# Patient Record
Sex: FEMALE | URBAN - NONMETROPOLITAN AREA
[De-identification: names, ages, dates, MRNs, and addresses within clinical notes are randomized per-mention and may not be internally consistent; named-entity substitution may affect disease eponyms.]

---

## 2023-05-03 ENCOUNTER — OUTSIDE SERVICES (OUTPATIENT)
Dept: INTERNAL MEDICINE | Age: 83
End: 2023-05-03

## 2023-05-03 DIAGNOSIS — R13.10 DYSPHAGIA, UNSPECIFIED TYPE: ICD-10-CM

## 2023-05-03 DIAGNOSIS — R47.1 DYSARTHRIA: ICD-10-CM

## 2023-05-03 DIAGNOSIS — E78.5 HYPERLIPIDEMIA, UNSPECIFIED HYPERLIPIDEMIA TYPE: Primary | ICD-10-CM

## 2023-05-03 DIAGNOSIS — I63.9 CEREBROVASCULAR ACCIDENT (CVA), UNSPECIFIED MECHANISM (HCC): ICD-10-CM

## 2023-05-03 DIAGNOSIS — G47.00 INSOMNIA, UNSPECIFIED TYPE: ICD-10-CM

## 2023-06-03 PROBLEM — E78.5 HYPERLIPIDEMIA: Status: ACTIVE | Noted: 2023-06-03

## 2023-06-03 PROBLEM — G47.00 INSOMNIA: Status: ACTIVE | Noted: 2023-06-03

## 2023-06-03 PROBLEM — I63.9 CEREBROVASCULAR ACCIDENT (CVA) (HCC): Status: ACTIVE | Noted: 2023-06-03

## 2023-06-03 NOTE — PROGRESS NOTES
Date of Admission: 4/28/2023  Date of Encounter: 5/3/2023  Patient:  Christina Nelson  YOB: 1940      Chief Complaint: Admission to [address] [place] [address] after treatment for CVA    History of Present Illness:  She was admitted to [facility] after treatment in the hospital CVA, she seems to be doing better now, she has a history of dementia but says that she feels fine. Seems to have dysarthria, but has no symptoms at this time. She denies headaches, visual changes, but says that she is a bit week. No issues reported by the nursing staff    Past Medical History: Hyperlipidemia  Past Family History: Noncontributory  Social History: Nonsmoker, non-drinker at this time    Review of Systems:  Constitutional: Denies fever, chills. Cardiac: Denies chest pain, palpitations  Except as dictated above, all other systems reviewed and are negative     Physical Exam  Vitals: /69. P 79. T 97.9. R 20  Constitutional: No acute distress  Eyes: No lid lag or proptosis. PERRLA  HENT: External ears normal. No lesions on the lips  Respiratory: Good air entry bilaterally. No wheezing or crackles  Cardiovascular: Normal S1-S2. No murmurs  Gastrointestinal: No visible veins or masses. Good bowel sounds  Skin: No abnormal rashes. No digital cyanosis  Neurologic: Cranial nerves grossly intact. Sensation grossly intact  Psychiatric: Alert. Normal Affect. Assessments   Diagnosis Orders   1. Hyperlipidemia, unspecified hyperlipidemia type        2. Dysphagia, unspecified type        3. Insomnia, unspecified type        4. Cerebrovascular accident (CVA), unspecified mechanism ([address]        5. Dysarthria            Plan  Will continue other medications unless otherwise noted. I will be notified with any change in her condition. This note has been created using the [employer] health record, and dictated in part by [employer] dictation system.  Despite the documenting physician's best

## 2023-06-07 ENCOUNTER — OUTSIDE SERVICES (OUTPATIENT)
Dept: INTERNAL MEDICINE | Age: 83
End: 2023-06-07

## 2023-06-07 DIAGNOSIS — G47.00 INSOMNIA, UNSPECIFIED TYPE: ICD-10-CM

## 2023-06-07 DIAGNOSIS — E78.5 HYPERLIPIDEMIA, UNSPECIFIED HYPERLIPIDEMIA TYPE: Primary | ICD-10-CM

## 2023-06-07 DIAGNOSIS — R13.10 DYSPHAGIA, UNSPECIFIED TYPE: ICD-10-CM

## 2023-06-07 DIAGNOSIS — R47.1 DYSARTHRIA: ICD-10-CM

## 2023-06-07 DIAGNOSIS — I63.9 CEREBROVASCULAR ACCIDENT (CVA), UNSPECIFIED MECHANISM (HCC): ICD-10-CM

## 2023-06-07 LAB
BACTERIA, URINE: ABNORMAL /HPF
BILIRUBIN URINE: NEGATIVE
BLOOD, URINE: NEGATIVE
CALCIUM OXALATE CRYSTALS: ABNORMAL /HPF
CASTS UA: ABNORMAL /LPF
CLARITY: ABNORMAL
COLOR, URINE: YELLOW
CRYSTALS, UA: PRESENT
GLUCOSE URINE: NEGATIVE MG/DL
KETONES, URINE: NEGATIVE MG/DL
LEUKOCYTE ESTERASE, URINE: ABNORMAL
NITRITE, URINE: NEGATIVE
PH UA: 6.5 (ref 5–8.5)
PROTEIN UA: NEGATIVE MG/DL
RBC URINE: ABNORMAL /HPF (ref 0–2)
SPECIFIC GRAVITY, URINE: 1.01 MG/DL (ref 1–1.03)
SQUAMOUS EPITHELIAL: ABNORMAL /HPF
UROBILINOGEN, URINE: 0.2 MG/DL (ref 0.2–1)
WBC URINE: ABNORMAL /HPF (ref 0–4)

## 2023-06-07 RX ORDER — SERTRALINE HYDROCHLORIDE 25 MG/1
25 TABLET, FILM COATED ORAL EVERY EVENING
COMMUNITY

## 2023-06-07 RX ORDER — BISACODYL 5 MG/1
5 TABLET, DELAYED RELEASE ORAL DAILY PRN
COMMUNITY

## 2023-06-07 RX ORDER — CLOPIDOGREL BISULFATE 75 MG/1
75 TABLET ORAL EVERY MORNING
COMMUNITY

## 2023-06-07 RX ORDER — ATORVASTATIN CALCIUM 40 MG/1
40 TABLET, FILM COATED ORAL EVERY EVENING
COMMUNITY

## 2023-06-07 RX ORDER — ACETAMINOPHEN 500 MG
1000 TABLET ORAL EVERY 8 HOURS PRN
COMMUNITY

## 2023-06-07 RX ORDER — SENNOSIDES A AND B 8.6 MG/1
1 TABLET, FILM COATED ORAL DAILY PRN
COMMUNITY

## 2023-06-07 RX ORDER — POLYETHYLENE GLYCOL 3350 17 G/17G
17 POWDER, FOR SOLUTION ORAL EVERY MORNING
COMMUNITY

## 2023-06-07 RX ORDER — ASPIRIN 81 MG/1
81 TABLET ORAL EVERY MORNING
COMMUNITY

## 2023-06-08 NOTE — PROGRESS NOTES
Date of Encounter: 6/7/2023  Patient:  Sedrick Beebe  YOB: 1940      Chief Complaint: Diarrhea    History of Present Illness:  I was informed by nursing staff that she has diarrhea, and has been using laxatives, Colace and MiraLAX. She confirms this, but denies abdominal pain, nausea, vomiting, hematochezia. Denies fever or chills. She does have a history of hemorrhoids, but is now more bothered about diarrhea. Past Medical/Surgical/Social History: Per admission note    Physical Exam  Vitals: Blood pressure 180/77. Pulse 64. Temperature 97.3. Respirations 18  Constitutional: No acute distress  Eyes: Sclerae nonicteric. No lid lag or proptosis  HENT: External ears normal. No external lesions on the nose. No lesions on the lips  Neck: No gross masses. Trachea visibly midline  Respiratory: Good air entry bilaterally. No wheezing or crackles  Cardiovascular: Normal S1-S2. No murmurs  Gastrointestinal: No visible masses. Skin: No abnormal rashes. No digital cyanosis  Neurologic: Cranial nerves grossly intact    Assessments   Diagnosis Orders   1. Hyperlipidemia, unspecified hyperlipidemia type        2. Dysphagia, unspecified type        3. Insomnia, unspecified type        4. Cerebrovascular accident (CVA), unspecified mechanism ([facility] [address]        5. Dysarthria            Plan  We will continue Colace, will switch MiraLAX to as needed. I will be notified of any changes to the patient's condition. Unless otherwise noted, given the stability of her condition, a gradual dose reduction in her medications is not indicated at this time. This note has been created using the [employer] health record, and dictated in part by [employer] dictation system. Despite the documenting physician's best efforts, there may be errors in spelling, grammar or syntax.

## 2023-06-21 ENCOUNTER — OUTSIDE SERVICES (OUTPATIENT)
Dept: INTERNAL MEDICINE | Age: 83
End: 2023-06-21

## 2023-06-21 DIAGNOSIS — R13.10 DYSPHAGIA, UNSPECIFIED TYPE: ICD-10-CM

## 2023-06-21 DIAGNOSIS — R47.1 DYSARTHRIA: ICD-10-CM

## 2023-06-21 DIAGNOSIS — G47.00 INSOMNIA, UNSPECIFIED TYPE: ICD-10-CM

## 2023-06-21 DIAGNOSIS — E78.5 HYPERLIPIDEMIA, UNSPECIFIED HYPERLIPIDEMIA TYPE: Primary | ICD-10-CM

## 2023-06-21 DIAGNOSIS — I63.9 CEREBROVASCULAR ACCIDENT (CVA), UNSPECIFIED MECHANISM (HCC): ICD-10-CM

## 2023-07-12 ENCOUNTER — OUTSIDE SERVICES (OUTPATIENT)
Dept: INTERNAL MEDICINE | Age: 83
End: 2023-07-12

## 2023-07-12 DIAGNOSIS — E78.5 HYPERLIPIDEMIA, UNSPECIFIED HYPERLIPIDEMIA TYPE: Primary | ICD-10-CM

## 2023-07-12 DIAGNOSIS — I63.9 CEREBROVASCULAR ACCIDENT (CVA), UNSPECIFIED MECHANISM (HCC): ICD-10-CM

## 2023-07-12 DIAGNOSIS — G47.00 INSOMNIA, UNSPECIFIED TYPE: ICD-10-CM

## 2023-07-12 DIAGNOSIS — R13.10 DYSPHAGIA, UNSPECIFIED TYPE: ICD-10-CM

## 2023-07-12 DIAGNOSIS — R47.1 DYSARTHRIA: ICD-10-CM

## 2023-07-12 PROCEDURE — 99309 SBSQ NF CARE MODERATE MDM 30: CPT | Performed by: INTERNAL MEDICINE

## 2023-07-12 NOTE — PROGRESS NOTES
Date of Encounter: 7/12/2023  Patient:  Dieudonne Monroe  YOB: 1940      Chief Complaint: Weight loss    History of Present Illness:  I was informed by the nursing staff that she has lost 12 pounds in 1 month. I was also informed that she is refusing meals. She says that she has no complaints, says that she eats more than she should. Says that she feels well. Denies nausea, vomiting, abdominal pain. Past Medical/Surgical/Social History: Per admission note    Physical Exam  Vitals: Blood pressure 134/64. Pulse 80. Temperature 96.5. Respirations 16  Constitutional: No acute distress  Eyes: Sclerae nonicteric. No lid lag or proptosis  HENT: External ears normal. No external lesions on the nose. No lesions on the lips  Neck: No gross masses. Trachea visibly midline  Respiratory: Good air entry bilaterally. No wheezing or crackles  Cardiovascular: Normal S1-S2. No murmurs  Gastrointestinal: No visible masses. Skin: No abnormal rashes. No digital cyanosis  Neurologic: Cranial nerves grossly intact    Assessments   Diagnosis Orders   1. Hyperlipidemia, unspecified hyperlipidemia type        2. Dysphagia, unspecified type        3. Insomnia, unspecified type        4. Cerebrovascular accident (CVA), unspecified mechanism ([address])        5. Dysarthria            Plan  We will start boost breeze, because she has dairy allergy. We will consider starting mirtazapine next visit  I will be notified of any changes to the patient's condition. Unless otherwise noted, given the stability of the condition, a gradual dose reduction in medications is not indicated at this time. This note has been created using the [employer] health record, and dictated in part by [employer] dictation system. Despite the documenting physician's best efforts, there may be errors in spelling, grammar or syntax.

## 2023-09-06 ENCOUNTER — OUTSIDE SERVICES (OUTPATIENT)
Dept: INTERNAL MEDICINE | Age: 83
End: 2023-09-06

## 2023-09-06 DIAGNOSIS — R13.10 DYSPHAGIA, UNSPECIFIED TYPE: ICD-10-CM

## 2023-09-06 DIAGNOSIS — I63.9 CEREBROVASCULAR ACCIDENT (CVA), UNSPECIFIED MECHANISM (HCC): ICD-10-CM

## 2023-09-06 DIAGNOSIS — E78.5 HYPERLIPIDEMIA, UNSPECIFIED HYPERLIPIDEMIA TYPE: Primary | ICD-10-CM

## 2023-09-06 DIAGNOSIS — G47.00 INSOMNIA, UNSPECIFIED TYPE: ICD-10-CM

## 2023-09-06 DIAGNOSIS — R29.898 WEAKNESS OF LEFT LOWER EXTREMITY: ICD-10-CM

## 2023-09-06 DIAGNOSIS — R47.1 DYSARTHRIA: ICD-10-CM

## 2023-09-06 PROCEDURE — 99309 SBSQ NF CARE MODERATE MDM 30: CPT | Performed by: INTERNAL MEDICINE

## 2023-09-14 NOTE — PROGRESS NOTES
Date of Encounter: 9/6/2023  Patient:  Henrry English  YOB: 1940      Chief Complaint: Follow up on chronic medical conditions    History of Present Illness:  No issues reported by nursing staff. She seems to be okay as well. However, she has a history of CVA and has left LE weakness. As per PT: \"She has increased weakness and instablility post CVA with demonstration ER at hip and hyperextension in her left knee\". Therefore, she would benefit from brace for improvement of stability and support with standing mobility. Past Medical/Surgical/Social History: Per admission note    Physical Exam  Vitals: /64. P 74. T 97.3. RR 18. Constitutional: No acute distress  Eyes: Sclerae nonicteric. No lid lag or proptosis  HENT: External ears normal. No external lesions on the nose. No lesions on the lips  Neck: No gross masses. Trachea visibly midline  Respiratory: Good air entry bilaterally. No wheezing or crackles  Cardiovascular: Normal S1-S2. No murmurs  Gastrointestinal: No visible masses. Skin: No abnormal rashes. No digital cyanosis  Neurologic: Cranial nerves grossly intact    Assessments   Diagnosis Orders   1. Hyperlipidemia, unspecified hyperlipidemia type        2. Dysphagia, unspecified type        3. Insomnia, unspecified type        4. Cerebrovascular accident (CVA), unspecified mechanism ([address])        5. Dysarthria        6. Weakness of left lower extremity            Plan  Will order brace for LLE weakness as detailed above  I will be notified of any changes to the patient's condition. Unless otherwise noted, a gradual dose reduction in medications is not indicated at this time. This note has been created using the [employer] health record, and dictated in part by [employer] dictation system. Despite the documenting physician's best efforts, there may be errors in spelling, grammar or syntax.

## 2023-11-01 ENCOUNTER — OUTSIDE SERVICES (OUTPATIENT)
Dept: INTERNAL MEDICINE | Age: 83
End: 2023-11-01

## 2023-11-01 DIAGNOSIS — R47.1 DYSARTHRIA: ICD-10-CM

## 2023-11-01 DIAGNOSIS — R29.898 WEAKNESS OF LEFT LOWER EXTREMITY: ICD-10-CM

## 2023-11-01 DIAGNOSIS — G47.00 INSOMNIA, UNSPECIFIED TYPE: ICD-10-CM

## 2023-11-01 DIAGNOSIS — E78.5 HYPERLIPIDEMIA, UNSPECIFIED HYPERLIPIDEMIA TYPE: Primary | ICD-10-CM

## 2023-11-01 DIAGNOSIS — R13.10 DYSPHAGIA, UNSPECIFIED TYPE: ICD-10-CM

## 2023-11-01 DIAGNOSIS — I63.9 CEREBROVASCULAR ACCIDENT (CVA), UNSPECIFIED MECHANISM (HCC): ICD-10-CM

## 2023-11-01 PROCEDURE — 99309 SBSQ NF CARE MODERATE MDM 30: CPT | Performed by: INTERNAL MEDICINE

## 2023-11-01 NOTE — PROGRESS NOTES
Date of Encounter: 11/1/2023  Patient:  Jhony Esquivel  YOB: 1940      Chief Complaint: Follow up on chronic medical conditions    History of Present Illness:  No issues reported by nursing staff. No reported complaints. She lies in her bed comfortably    Past Medical/Surgical/Social History: Per admission note    Physical Exam  Vitals: Blood pressure 105/52. Pulse 68. Temperature 98.8. Respirations 18. Constitutional: No acute distress  Eyes: Sclerae nonicteric. No lid lag or proptosis  HENT: External ears normal. No external lesions on the nose. No lesions on the lips  Neck: No gross masses. Trachea visibly midline  Respiratory: Good air entry bilaterally. No wheezing or crackles  Cardiovascular: Normal S1-S2. No murmurs  Gastrointestinal: No visible masses. Skin: No abnormal rashes. No digital cyanosis  Neurologic: Cranial nerves grossly intact    Assessments   Diagnosis Orders   1. Hyperlipidemia, unspecified hyperlipidemia type        2. Dysphagia, unspecified type        3. Insomnia, unspecified type        4. Cerebrovascular accident (CVA), unspecified mechanism ([address])        5. Dysarthria        6. Weakness of left lower extremity            Plan  I will be notified of any changes to the patient's condition. Unless otherwise noted, a gradual dose reduction in medications is not indicated at this time. This note has been created using the [employer] health record, and dictated in part by [employer] dictation system. Despite the documenting physician's best efforts, there may be errors in spelling, grammar or syntax.

## 2023-11-29 ENCOUNTER — OUTSIDE SERVICES (OUTPATIENT)
Dept: INTERNAL MEDICINE | Age: 83
End: 2023-11-29

## 2023-11-29 DIAGNOSIS — R47.1 DYSARTHRIA: ICD-10-CM

## 2023-11-29 DIAGNOSIS — R13.10 DYSPHAGIA, UNSPECIFIED TYPE: ICD-10-CM

## 2023-11-29 DIAGNOSIS — I63.9 CEREBROVASCULAR ACCIDENT (CVA), UNSPECIFIED MECHANISM (HCC): ICD-10-CM

## 2023-11-29 DIAGNOSIS — G47.00 INSOMNIA, UNSPECIFIED TYPE: ICD-10-CM

## 2023-11-29 DIAGNOSIS — E78.5 HYPERLIPIDEMIA, UNSPECIFIED HYPERLIPIDEMIA TYPE: Primary | ICD-10-CM

## 2023-11-29 PROCEDURE — 99309 SBSQ NF CARE MODERATE MDM 30: CPT | Performed by: INTERNAL MEDICINE

## 2023-11-30 NOTE — PROGRESS NOTES
Date of Encounter: 11/29/2023  Patient:  Bre Borden  YOB: 1940      Chief Complaint: Follow up on chronic medical conditions    History of Present Illness:  No issues reported by nursing staff. No reported complaints. She lies in bed comfortably    Past Medical/Surgical/Social History: Per admission note    Physical Exam  Vitals: Blood Pressure 127/80. Pulse 65. Temperature 97.4. Respirations 14  Constitutional: No acute distress  Eyes: Sclerae nonicteric. No lid lag or proptosis  HENT: External ears normal. No external lesions on the nose. No lesions on the lips  Neck: No gross masses. Trachea visibly midline  Respiratory: Good air entry bilaterally. No wheezing or crackles  Cardiovascular: Normal S1-S2. No murmurs  Gastrointestinal: No visible masses. Skin: No abnormal rashes. No digital cyanosis  Neurologic: Cranial nerves grossly intact    Assessments   Diagnosis Orders   1. Hyperlipidemia, unspecified hyperlipidemia type        2. Dysphagia, unspecified type        3. Insomnia, unspecified type        4. Cerebrovascular accident (CVA), unspecified mechanism ([address])        5. Dysarthria            Plan  I will be notified of any changes to the patient's condition. Unless otherwise noted, a gradual dose reduction in medications is not indicated at this time. This note has been created using the [employer] health record, and dictated in part by [email or web address] dictation system. Despite the documenting physician's best efforts, there may be errors in spelling, grammar or syntax.

## 2023-12-27 ENCOUNTER — OUTSIDE SERVICES (OUTPATIENT)
Dept: INTERNAL MEDICINE | Age: 83
End: 2023-12-27
Payer: MEDICARE

## 2023-12-27 DIAGNOSIS — E78.5 HYPERLIPIDEMIA, UNSPECIFIED HYPERLIPIDEMIA TYPE: Primary | ICD-10-CM

## 2023-12-27 DIAGNOSIS — R13.10 DYSPHAGIA, UNSPECIFIED TYPE: ICD-10-CM

## 2023-12-27 DIAGNOSIS — R47.1 DYSARTHRIA: ICD-10-CM

## 2023-12-27 DIAGNOSIS — G47.00 INSOMNIA, UNSPECIFIED TYPE: ICD-10-CM

## 2023-12-27 DIAGNOSIS — I63.9 CEREBROVASCULAR ACCIDENT (CVA), UNSPECIFIED MECHANISM (HCC): ICD-10-CM

## 2023-12-27 PROCEDURE — 99309 SBSQ NF CARE MODERATE MDM 30: CPT | Performed by: INTERNAL MEDICINE

## 2023-12-28 NOTE — PROGRESS NOTES
Date of Encounter: 12/27/2023  Patient:  Natalya Alford  YOB: 1940      Chief Complaint: Follow up on chronic medical conditions    History of Present Illness:  No issues reported by nursing staff, but she says that she was having a bad day and started tearing up. She says that Lety is always a hard time for her. I advised that we can have someone talk to her. She says that she skipped her Remeron last night, I advised that Remeron is anti depressant as well as a sleeping pill. She verbalized her understanding    Past Medical/Surgical/Social History: Per admission note    Physical Exam  Vitals: Blood Pressure 108/60. Pulse 71. Temperature 97.4. Respirations 16  Constitutional: No acute distress  Eyes: Sclerae nonicteric. No lid lag or proptosis  HENT: External ears normal. No external lesions on the nose. No lesions on the lips  Neck: No gross masses. Trachea visibly midline  Respiratory: Good air entry bilaterally. No wheezing or crackles  Cardiovascular: Normal S1-S2. No murmurs  Gastrointestinal: No visible masses. Skin: No abnormal rashes. No digital cyanosis  Neurologic: Cranial nerves grossly intact    Assessments   Diagnosis Orders   1. Hyperlipidemia, unspecified hyperlipidemia type        2. Dysphagia, unspecified type        3. Insomnia, unspecified type        4. Cerebrovascular accident (CVA), unspecified mechanism ([address])        5. Dysarthria            Plan  I will be notified of any changes to the patient's condition. Unless otherwise noted, a gradual dose reduction in medications is not indicated at this time. This note has been created using the [employer] health record, and dictated in part by [employer] dictation system. Despite the documenting physician's best efforts, there may be errors in spelling, grammar or syntax.

## 2024-01-31 ENCOUNTER — OUTSIDE SERVICES (OUTPATIENT)
Dept: INTERNAL MEDICINE | Age: 84
End: 2024-01-31
Payer: MEDICARE

## 2024-01-31 DIAGNOSIS — R47.1 DYSARTHRIA: ICD-10-CM

## 2024-01-31 DIAGNOSIS — R29.898 WEAKNESS OF LEFT LOWER EXTREMITY: ICD-10-CM

## 2024-01-31 DIAGNOSIS — R13.10 DYSPHAGIA, UNSPECIFIED TYPE: ICD-10-CM

## 2024-01-31 DIAGNOSIS — I63.9 CEREBROVASCULAR ACCIDENT (CVA), UNSPECIFIED MECHANISM (HCC): ICD-10-CM

## 2024-01-31 DIAGNOSIS — E78.5 HYPERLIPIDEMIA, UNSPECIFIED HYPERLIPIDEMIA TYPE: Primary | ICD-10-CM

## 2024-01-31 DIAGNOSIS — G47.00 INSOMNIA, UNSPECIFIED TYPE: ICD-10-CM

## 2024-01-31 PROCEDURE — 99309 SBSQ NF CARE MODERATE MDM 30: CPT | Performed by: INTERNAL MEDICINE

## 2024-02-21 ENCOUNTER — OUTSIDE SERVICES (OUTPATIENT)
Dept: INTERNAL MEDICINE | Age: 84
End: 2024-02-21
Payer: MEDICARE

## 2024-02-21 DIAGNOSIS — R47.1 DYSARTHRIA: ICD-10-CM

## 2024-02-21 DIAGNOSIS — R29.898 WEAKNESS OF LEFT LOWER EXTREMITY: ICD-10-CM

## 2024-02-21 DIAGNOSIS — G47.00 INSOMNIA, UNSPECIFIED TYPE: ICD-10-CM

## 2024-02-21 DIAGNOSIS — E78.5 HYPERLIPIDEMIA, UNSPECIFIED HYPERLIPIDEMIA TYPE: Primary | ICD-10-CM

## 2024-02-21 DIAGNOSIS — I63.9 CEREBROVASCULAR ACCIDENT (CVA), UNSPECIFIED MECHANISM (HCC): ICD-10-CM

## 2024-02-21 PROCEDURE — 99309 SBSQ NF CARE MODERATE MDM 30: CPT | Performed by: INTERNAL MEDICINE

## 2024-02-24 NOTE — PROGRESS NOTES
Date of Encounter: 2/21/2024  Patient:  Soheila Arenas  YOB: 1940      Chief Complaint: Follow up on chronic medical conditions    History of Present Illness:  No issues reported by nursing staff. No reported complaints.      Past Medical/Surgical/Social History: Per admission note    Physical Exam  Vitals: Blood Pressure 100/54. Pulse 62. Temperature 97.4. Respirations 18  Constitutional: No acute distress  Eyes: Sclerae nonicteric. No lid lag or proptosis  HENT: External ears normal. No external lesions on the nose. No lesions on the lips  Neck: No gross masses. Trachea visibly midline  Respiratory: Good air entry bilaterally. No wheezing or crackles  Cardiovascular: Normal S1-S2. No murmurs  Gastrointestinal: No visible masses.   Skin: No abnormal rashes. No digital cyanosis  Neurologic: Cranial nerves grossly intact    Assessments   Diagnosis Orders   1. Hyperlipidemia, unspecified hyperlipidemia type        2. Insomnia, unspecified type        3. Cerebrovascular accident (CVA), unspecified mechanism (HCC)        4. Dysarthria        5. Weakness of left lower extremity            Plan  I will be notified of any changes to the patient's condition. Unless otherwise noted, a gradual dose reduction in medications is not indicated at this time.      This note has been created using the [employer] electronic health record, and dictated in part by [employer] One dictation system. Despite the documenting physician's best efforts, there may be errors in spelling, grammar or syntax.

## 2024-03-13 ENCOUNTER — OUTSIDE SERVICES (OUTPATIENT)
Dept: INTERNAL MEDICINE | Age: 84
End: 2024-03-13
Payer: MEDICARE

## 2024-03-13 DIAGNOSIS — G47.00 INSOMNIA, UNSPECIFIED TYPE: ICD-10-CM

## 2024-03-13 DIAGNOSIS — R47.1 DYSARTHRIA: ICD-10-CM

## 2024-03-13 DIAGNOSIS — I63.9 CEREBROVASCULAR ACCIDENT (CVA), UNSPECIFIED MECHANISM (HCC): ICD-10-CM

## 2024-03-13 DIAGNOSIS — R13.10 DYSPHAGIA, UNSPECIFIED TYPE: ICD-10-CM

## 2024-03-13 DIAGNOSIS — E78.5 HYPERLIPIDEMIA, UNSPECIFIED HYPERLIPIDEMIA TYPE: Primary | ICD-10-CM

## 2024-03-13 DIAGNOSIS — R29.898 WEAKNESS OF LEFT LOWER EXTREMITY: ICD-10-CM

## 2024-03-13 PROCEDURE — 99309 SBSQ NF CARE MODERATE MDM 30: CPT | Performed by: INTERNAL MEDICINE

## 2024-03-20 NOTE — PROGRESS NOTES
Date of Encounter: 3/13/2024  Patient:  Sohelia Arenas  YOB: 1940      Chief Complaint:  Weight loss    History of Present Illness:  I was informed by the nursing staff that she has been having weight loss as well as loss of appetite.  However, patient herself says that she thinks she has a problem with lactose.  She says that when she does not drink her boost, because she feels bloated afterwards.  I advised that we can try another protein drink but does not have lactose in it and she agrees to try.  She denies nausea, vomiting, diarrhea, constipation, abdominal pain.  Does not seem to be depressed.    Past Medical/Surgical/Social History: Per admission note    Physical Exam  Vitals: Blood Pressure 110/62. Pulse 56. Temperature 97.6. Respirations 18  Constitutional: No acute distress  Eyes: Sclerae nonicteric. No lid lag or proptosis  HENT: External ears normal. No external lesions on the nose. No lesions on the lips  Neck: No gross masses. Trachea visibly midline  Respiratory: Good air entry bilaterally. No wheezing or crackles  Cardiovascular: Normal S1-S2. No murmurs  Gastrointestinal: No visible masses.   Skin: No abnormal rashes. No digital cyanosis  Neurologic: Cranial nerves grossly intact    Assessments   Diagnosis Orders   1. Hyperlipidemia, unspecified hyperlipidemia type        2. Insomnia, unspecified type        3. Cerebrovascular accident (CVA), unspecified mechanism (HCC)        4. Dysarthria        5. Dysphagia, unspecified type        6. Weakness of left lower extremity            Plan  Will try boost breeze instead of regular boost.  Reassess next visit  I will be notified of any changes to the patient's condition. Unless otherwise noted, a gradual dose reduction in medications is not indicated at this time.      This note has been created using the Epic electronic health record, and dictated in part by [employer] One dictation system. Despite the documenting

## 2024-04-10 ENCOUNTER — OUTSIDE SERVICES (OUTPATIENT)
Dept: INTERNAL MEDICINE | Age: 84
End: 2024-04-10
Payer: MEDICARE

## 2024-04-10 DIAGNOSIS — R47.1 DYSARTHRIA: ICD-10-CM

## 2024-04-10 DIAGNOSIS — R13.10 DYSPHAGIA, UNSPECIFIED TYPE: ICD-10-CM

## 2024-04-10 DIAGNOSIS — I63.9 CEREBROVASCULAR ACCIDENT (CVA), UNSPECIFIED MECHANISM (HCC): ICD-10-CM

## 2024-04-10 DIAGNOSIS — G47.00 INSOMNIA, UNSPECIFIED TYPE: Primary | ICD-10-CM

## 2024-04-10 DIAGNOSIS — E78.5 HYPERLIPIDEMIA, UNSPECIFIED HYPERLIPIDEMIA TYPE: ICD-10-CM

## 2024-04-10 PROCEDURE — 99309 SBSQ NF CARE MODERATE MDM 30: CPT | Performed by: INTERNAL MEDICINE

## 2024-04-18 NOTE — PROGRESS NOTES
Date of Encounter: 4/10/2024  Patient:  Soheila Arenas  YOB: 1940      Chief Complaint: Follow up on chronic medical conditions    History of Present Illness:  No issues reported by nursing staff. No reported complaints.      Past Medical/Surgical/Social History: Per admission note    Physical Exam  Vitals: Blood Pressure 116/60. Pulse 72. Temperature 97.6. Respirations 18  Constitutional: No acute distress  Eyes: Sclerae nonicteric. No lid lag or proptosis  HENT: External ears normal. No external lesions on the nose. No lesions on the lips  Neck: No gross masses. Trachea visibly midline  Respiratory: Good air entry bilaterally. No wheezing or crackles  Cardiovascular: Normal S1-S2. No murmurs  Gastrointestinal: No visible masses.   Skin: No abnormal rashes. No digital cyanosis  Neurologic: Cranial nerves grossly intact    Assessments   Diagnosis Orders   1. Insomnia, unspecified type        2. Hyperlipidemia, unspecified hyperlipidemia type        3. Cerebrovascular accident (CVA), unspecified mechanism (HCC)        4. Dysarthria        5. Dysphagia, unspecified type            Plan  I will be notified of any changes to the patient's condition. Unless otherwise noted, a gradual dose reduction in medications is not indicated at this time.      This note has been created using the Epic electronic health record, and dictated in part by [employer] One dictation system. Despite the documenting physician's best efforts, there may be errors in spelling, grammar or syntax.

## 2024-05-08 ENCOUNTER — OUTSIDE SERVICES (OUTPATIENT)
Dept: INTERNAL MEDICINE | Age: 84
End: 2024-05-08
Payer: MEDICARE

## 2024-05-08 DIAGNOSIS — R47.1 DYSARTHRIA: ICD-10-CM

## 2024-05-08 DIAGNOSIS — G47.00 INSOMNIA, UNSPECIFIED TYPE: Primary | ICD-10-CM

## 2024-05-08 DIAGNOSIS — R13.10 DYSPHAGIA, UNSPECIFIED TYPE: ICD-10-CM

## 2024-05-08 DIAGNOSIS — I63.9 CEREBROVASCULAR ACCIDENT (CVA), UNSPECIFIED MECHANISM (HCC): ICD-10-CM

## 2024-05-08 DIAGNOSIS — E78.5 HYPERLIPIDEMIA, UNSPECIFIED HYPERLIPIDEMIA TYPE: ICD-10-CM

## 2024-05-08 PROCEDURE — 99309 SBSQ NF CARE MODERATE MDM 30: CPT | Performed by: INTERNAL MEDICINE

## 2024-05-13 NOTE — PROGRESS NOTES
Date of Encounter: 5/8/2024  Patient:  Soheila Arenas  YOB: 1940      Chief Complaint: Follow up on chronic medical conditions    History of Present Illness:  No issues reported by nursing staff. No reported complaints.      Past Medical/Surgical/Social History: Per admission note    Physical Exam  Vitals: Blood Pressure 125/60. Pulse 62. Temperature 97.7. Respirations 17  Constitutional: No acute distress  Eyes: Sclerae nonicteric. No lid lag or proptosis  HENT: External ears normal. No external lesions on the nose. No lesions on the lips  Neck: No gross masses. Trachea visibly midline  Respiratory: Good air entry bilaterally. No wheezing or crackles  Cardiovascular: Normal S1-S2. No murmurs  Gastrointestinal: No visible masses.   Skin: No abnormal rashes. No digital cyanosis  Neurologic: Cranial nerves grossly intact    Assessments   Diagnosis Orders   1. Insomnia, unspecified type        2. Hyperlipidemia, unspecified hyperlipidemia type        3. Cerebrovascular accident (CVA), unspecified mechanism (HCC)        4. Dysarthria        5. Dysphagia, unspecified type            Plan  I will be notified of any changes to the patient's condition. Unless otherwise noted, a gradual dose reduction in medications is not indicated at this time.      This note has been created using the Epic electronic health record, and dictated in part by [employer] One dictation system. Despite the documenting physician's best efforts, there may be errors in spelling, grammar or syntax.

## 2024-05-29 ENCOUNTER — OUTSIDE SERVICES (OUTPATIENT)
Dept: INTERNAL MEDICINE | Age: 84
End: 2024-05-29
Payer: MEDICARE

## 2024-05-29 DIAGNOSIS — G47.00 INSOMNIA, UNSPECIFIED TYPE: Primary | ICD-10-CM

## 2024-05-29 DIAGNOSIS — I63.9 CEREBROVASCULAR ACCIDENT (CVA), UNSPECIFIED MECHANISM (HCC): ICD-10-CM

## 2024-05-29 DIAGNOSIS — E78.5 HYPERLIPIDEMIA, UNSPECIFIED HYPERLIPIDEMIA TYPE: ICD-10-CM

## 2024-05-29 DIAGNOSIS — R13.10 DYSPHAGIA, UNSPECIFIED TYPE: ICD-10-CM

## 2024-05-29 DIAGNOSIS — R47.1 DYSARTHRIA: ICD-10-CM

## 2024-05-29 PROCEDURE — 99309 SBSQ NF CARE MODERATE MDM 30: CPT | Performed by: INTERNAL MEDICINE

## 2024-05-29 NOTE — PROGRESS NOTES
changes to the patient's condition. Unless otherwise noted, a gradual dose reduction in medications is not indicated at this time.      This note has been created using the Epic electronic health record, and dictated in part by [employer] One dictation system. Despite the documenting physician's best efforts, there may be errors in spelling, grammar or syntax.

## 2024-06-26 ENCOUNTER — OUTSIDE SERVICES (OUTPATIENT)
Dept: INTERNAL MEDICINE | Age: 84
End: 2024-06-26
Payer: MEDICARE

## 2024-06-26 DIAGNOSIS — G47.00 INSOMNIA, UNSPECIFIED TYPE: Primary | ICD-10-CM

## 2024-06-26 DIAGNOSIS — I63.9 CEREBROVASCULAR ACCIDENT (CVA), UNSPECIFIED MECHANISM (HCC): ICD-10-CM

## 2024-06-26 DIAGNOSIS — R13.10 DYSPHAGIA, UNSPECIFIED TYPE: ICD-10-CM

## 2024-06-26 DIAGNOSIS — R47.1 DYSARTHRIA: ICD-10-CM

## 2024-06-26 DIAGNOSIS — E78.5 HYPERLIPIDEMIA, UNSPECIFIED HYPERLIPIDEMIA TYPE: ICD-10-CM

## 2024-06-26 PROCEDURE — 99309 SBSQ NF CARE MODERATE MDM 30: CPT | Performed by: INTERNAL MEDICINE

## 2024-07-18 NOTE — PROGRESS NOTES
Date of Encounter: 6/26/2024  Patient:  Soheila Arenas  YOB: 1940      Chief Complaint: Follow up on chronic medical conditions    History of Present Illness:  No issues reported by nursing staff. No reported complaints.      Past Medical/Surgical/Social History: Per admission note    Physical Exam  Vitals: Blood Pressure 118/59. Pulse 62. Temperature 97.5. Respirations 16  Constitutional: No acute distress  Eyes: Sclerae nonicteric. No lid lag or proptosis  HENT: External ears normal. No external lesions on the nose. No lesions on the lips  Neck: No gross masses. Trachea visibly midline  Respiratory: Good air entry bilaterally. No wheezing or crackles  Cardiovascular: Normal S1-S2. No murmurs  Gastrointestinal: No visible masses.   Skin: No abnormal rashes. No digital cyanosis  Neurologic: Cranial nerves grossly intact    Assessments   Diagnosis Orders   1. Insomnia, unspecified type        2. Hyperlipidemia, unspecified hyperlipidemia type        3. Cerebrovascular accident (CVA), unspecified mechanism (HCC)        4. Dysarthria        5. Dysphagia, unspecified type            Plan  I will be notified of any changes to the patient's condition. Unless otherwise noted, a gradual dose reduction in medications is not indicated at this time.      This note has been created using the Epic electronic health record, and dictated in part by [employer] One dictation system. Despite the documenting physician's best efforts, there may be errors in spelling, grammar or syntax.

## 2024-07-26 ENCOUNTER — OUTSIDE SERVICES (OUTPATIENT)
Dept: INTERNAL MEDICINE | Age: 84
End: 2024-07-26
Payer: MEDICARE

## 2024-07-26 DIAGNOSIS — G47.00 INSOMNIA, UNSPECIFIED TYPE: Primary | ICD-10-CM

## 2024-07-26 DIAGNOSIS — E78.5 HYPERLIPIDEMIA, UNSPECIFIED HYPERLIPIDEMIA TYPE: ICD-10-CM

## 2024-07-26 DIAGNOSIS — R47.1 DYSARTHRIA: ICD-10-CM

## 2024-07-26 DIAGNOSIS — I63.9 CEREBROVASCULAR ACCIDENT (CVA), UNSPECIFIED MECHANISM (HCC): ICD-10-CM

## 2024-07-26 DIAGNOSIS — R13.10 DYSPHAGIA, UNSPECIFIED TYPE: ICD-10-CM

## 2024-07-26 PROCEDURE — 99309 SBSQ NF CARE MODERATE MDM 30: CPT | Performed by: INTERNAL MEDICINE

## 2024-07-27 NOTE — PROGRESS NOTES
Date of Encounter: 7/26/2024  Patient:  Soheila Arenas  YOB: 1940      Chief Complaint: Follow up on chronic medical conditions    History of Present Illness:  No issues reported by nursing staff. No reported complaints.      Past Medical/Surgical/Social History: Per admission note    Physical Exam  Vitals: Blood Pressure 146/80. Pulse 74. Temperature 96.8. Respirations 18  Constitutional: No acute distress  Eyes: Sclerae nonicteric. No lid lag or proptosis  HENT: External ears normal. No external lesions on the nose. No lesions on the lips  Neck: No gross masses. Trachea visibly midline  Respiratory: Good air entry bilaterally. No wheezing or crackles  Cardiovascular: Normal S1-S2. No murmurs  Gastrointestinal: No visible masses.   Skin: No abnormal rashes. No digital cyanosis  Neurologic: Cranial nerves grossly intact    Assessments   Diagnosis Orders   1. Insomnia, unspecified type        2. Hyperlipidemia, unspecified hyperlipidemia type        3. Cerebrovascular accident (CVA), unspecified mechanism (HCC)        4. Dysarthria        5. Dysphagia, unspecified type            Plan  I will be notified of any changes to the patient's condition. Unless otherwise noted, a gradual dose reduction in medications is not indicated at this time.      This note has been created using the Epic electronic health record, and dictated in part by [employer] One dictation system. Despite the documenting physician's best efforts, there may be errors in spelling, grammar or syntax.

## 2024-07-31 ENCOUNTER — OUTSIDE SERVICES (OUTPATIENT)
Dept: INTERNAL MEDICINE | Age: 84
End: 2024-07-31
Payer: MEDICARE

## 2024-07-31 DIAGNOSIS — R47.1 DYSARTHRIA: ICD-10-CM

## 2024-07-31 DIAGNOSIS — G47.00 INSOMNIA, UNSPECIFIED TYPE: Primary | ICD-10-CM

## 2024-07-31 DIAGNOSIS — R13.10 DYSPHAGIA, UNSPECIFIED TYPE: ICD-10-CM

## 2024-07-31 DIAGNOSIS — E78.5 HYPERLIPIDEMIA, UNSPECIFIED HYPERLIPIDEMIA TYPE: ICD-10-CM

## 2024-07-31 DIAGNOSIS — I63.9 CEREBROVASCULAR ACCIDENT (CVA), UNSPECIFIED MECHANISM (HCC): ICD-10-CM

## 2024-07-31 PROCEDURE — 99309 SBSQ NF CARE MODERATE MDM 30: CPT | Performed by: INTERNAL MEDICINE

## 2024-08-06 NOTE — PROGRESS NOTES
Date of Encounter: 7/31/2024  Patient:  Soheila Arenas  YOB: 1940      Chief Complaint: Follow up on chronic medical conditions    History of Present Illness:  No issues reported by nursing staff. No reported complaints.      Past Medical/Surgical/Social History: Per admission note    Physical Exam  Vitals: Blood Pressure 136/80. Pulse 74. Temperature 96.8. Respirations 18  Constitutional: No acute distress  Eyes: Sclerae nonicteric. No lid lag or proptosis  HENT: External ears normal. No external lesions on the nose. No lesions on the lips  Neck: No gross masses. Trachea visibly midline  Respiratory: Good air entry bilaterally. No wheezing or crackles  Cardiovascular: Normal S1-S2. No murmurs  Gastrointestinal: No visible masses.   Skin: No abnormal rashes. No digital cyanosis  Neurologic: Cranial nerves grossly intact    Assessments   Diagnosis Orders   1. Insomnia, unspecified type        2. Hyperlipidemia, unspecified hyperlipidemia type        3. Cerebrovascular accident (CVA), unspecified mechanism (HCC)        4. Dysarthria        5. Dysphagia, unspecified type            Plan  I will be notified of any changes to the patient's condition. Unless otherwise noted, a gradual dose reduction in medications is not indicated at this time.      This note has been created using the Epic electronic health record, and dictated in part by [employer] dictation system. Despite the documenting physician's best efforts, there may be errors in spelling, grammar or syntax.

## 2024-08-28 ENCOUNTER — OUTSIDE SERVICES (OUTPATIENT)
Dept: INTERNAL MEDICINE | Age: 84
End: 2024-08-28

## 2024-08-28 DIAGNOSIS — G47.00 INSOMNIA, UNSPECIFIED TYPE: Primary | ICD-10-CM

## 2024-08-28 DIAGNOSIS — R47.1 DYSARTHRIA: ICD-10-CM

## 2024-08-28 DIAGNOSIS — I63.9 CEREBROVASCULAR ACCIDENT (CVA), UNSPECIFIED MECHANISM (HCC): ICD-10-CM

## 2024-08-28 DIAGNOSIS — R13.10 DYSPHAGIA, UNSPECIFIED TYPE: ICD-10-CM

## 2024-08-28 DIAGNOSIS — E78.5 HYPERLIPIDEMIA, UNSPECIFIED HYPERLIPIDEMIA TYPE: ICD-10-CM

## 2024-08-28 NOTE — PROGRESS NOTES
Date of Encounter: 8/28/2024  Patient:  Soheila Arenas  YOB: 1940      Chief Complaint: Follow up on chronic medical conditions    History of Present Illness:  No issues reported by nursing staff. No reported complaints.  She lays in bed in no distress    Past Medical/Surgical/Social History: Per admission note    Physical Exam  Vitals: Blood Pressure 109/54. Pulse 62. Temperature 97.7. Respirations 17  Constitutional: No acute distress  Eyes: Sclerae nonicteric. No lid lag or proptosis  HENT: External ears normal. No external lesions on the nose. No lesions on the lips  Neck: No gross masses. Trachea visibly midline  Respiratory: Good air entry bilaterally. No wheezing or crackles  Cardiovascular: Normal S1-S2. No murmurs  Gastrointestinal: No visible masses.   Skin: No abnormal rashes. No digital cyanosis  Neurologic: Cranial nerves grossly intact    Assessments   Diagnosis Orders   1. Insomnia, unspecified type        2. Hyperlipidemia, unspecified hyperlipidemia type        3. Cerebrovascular accident (CVA), unspecified mechanism (HCC)        4. Dysarthria        5. Dysphagia, unspecified type            Plan  I will be notified of any changes to the patient's condition. Unless otherwise noted, a gradual dose reduction in medications is not indicated at this time.      This note has been created using the Epic electronic health record, and dictated in part by [employer] dictation system. Despite the documenting physician's best efforts, there may be errors in spelling, grammar or syntax.

## 2024-09-25 ENCOUNTER — OUTSIDE SERVICES (OUTPATIENT)
Dept: INTERNAL MEDICINE | Age: 84
End: 2024-09-25

## 2024-09-25 DIAGNOSIS — E78.5 HYPERLIPIDEMIA, UNSPECIFIED HYPERLIPIDEMIA TYPE: ICD-10-CM

## 2024-09-25 DIAGNOSIS — R47.1 DYSARTHRIA: ICD-10-CM

## 2024-09-25 DIAGNOSIS — I63.9 CEREBROVASCULAR ACCIDENT (CVA), UNSPECIFIED MECHANISM (HCC): ICD-10-CM

## 2024-09-25 DIAGNOSIS — R13.10 DYSPHAGIA, UNSPECIFIED TYPE: ICD-10-CM

## 2024-09-25 DIAGNOSIS — G47.00 INSOMNIA, UNSPECIFIED TYPE: Primary | ICD-10-CM

## 2024-10-23 ENCOUNTER — OUTSIDE SERVICES (OUTPATIENT)
Dept: INTERNAL MEDICINE | Age: 84
End: 2024-10-23

## 2024-10-23 DIAGNOSIS — I63.9 CEREBROVASCULAR ACCIDENT (CVA), UNSPECIFIED MECHANISM (HCC): ICD-10-CM

## 2024-10-23 DIAGNOSIS — R29.898 WEAKNESS OF LEFT LOWER EXTREMITY: ICD-10-CM

## 2024-10-23 DIAGNOSIS — R47.1 DYSARTHRIA: ICD-10-CM

## 2024-10-23 DIAGNOSIS — G47.00 INSOMNIA, UNSPECIFIED TYPE: Primary | ICD-10-CM

## 2024-10-23 DIAGNOSIS — R13.10 DYSPHAGIA, UNSPECIFIED TYPE: ICD-10-CM

## 2024-10-23 DIAGNOSIS — E78.5 HYPERLIPIDEMIA, UNSPECIFIED HYPERLIPIDEMIA TYPE: ICD-10-CM

## 2024-11-20 ENCOUNTER — OUTSIDE SERVICES (OUTPATIENT)
Dept: INTERNAL MEDICINE | Age: 84
End: 2024-11-20

## 2024-11-20 DIAGNOSIS — I63.9 CEREBROVASCULAR ACCIDENT (CVA), UNSPECIFIED MECHANISM (HCC): ICD-10-CM

## 2024-11-20 DIAGNOSIS — R47.1 DYSARTHRIA: ICD-10-CM

## 2024-11-20 DIAGNOSIS — R13.10 DYSPHAGIA, UNSPECIFIED TYPE: ICD-10-CM

## 2024-11-20 DIAGNOSIS — E78.5 HYPERLIPIDEMIA, UNSPECIFIED HYPERLIPIDEMIA TYPE: ICD-10-CM

## 2024-11-20 DIAGNOSIS — G47.00 INSOMNIA, UNSPECIFIED TYPE: Primary | ICD-10-CM

## 2024-11-22 NOTE — PROGRESS NOTES
Date of Encounter: 11/20/2024  Patient:  Soheila Arenas  YOB: 1940      Chief Complaint: Follow up on chronic medical conditions    History of Present Illness:  No issues reported by nursing staff. No reported complaints.  She lies in her bed in no distress    Past Medical/Surgical/Social History: Per admission note    Physical Exam  Vitals: Blood Pressure 106/57. Pulse 63. Temperature 97.6. Respirations 16  Constitutional: No acute distress  Eyes: Sclerae nonicteric. No lid lag or proptosis  HENT: External ears normal. No external lesions on the nose. No lesions on the lips  Neck: No gross masses. Trachea visibly midline  Respiratory: Good air entry bilaterally. No wheezing or crackles  Cardiovascular: Normal S1-S2. No murmurs  Gastrointestinal: No visible masses.   Skin: No abnormal rashes. No digital cyanosis  Neurologic: Cranial nerves grossly intact    Assessments   Diagnosis Orders   1. Insomnia, unspecified type        2. Hyperlipidemia, unspecified hyperlipidemia type        3. Cerebrovascular accident (CVA), unspecified mechanism (HCC)        4. Dysarthria        5. Dysphagia, unspecified type            Plan  I will be notified of any changes to the patient's condition. Unless otherwise noted, a gradual dose reduction in medications is not indicated at this time.      This note has been created using the Epic electronic health record, and dictated in part by [employer] dictation system. Despite the documenting physician's best efforts, there may be errors in spelling, grammar or syntax.

## 2024-12-18 ENCOUNTER — OUTSIDE SERVICES (OUTPATIENT)
Dept: INTERNAL MEDICINE | Age: 84
End: 2024-12-18

## 2024-12-18 DIAGNOSIS — R13.10 DYSPHAGIA, UNSPECIFIED TYPE: ICD-10-CM

## 2024-12-18 DIAGNOSIS — G47.00 INSOMNIA, UNSPECIFIED TYPE: Primary | ICD-10-CM

## 2024-12-18 DIAGNOSIS — R47.1 DYSARTHRIA: ICD-10-CM

## 2024-12-18 DIAGNOSIS — E78.5 HYPERLIPIDEMIA, UNSPECIFIED HYPERLIPIDEMIA TYPE: ICD-10-CM

## 2024-12-18 DIAGNOSIS — I63.9 CEREBROVASCULAR ACCIDENT (CVA), UNSPECIFIED MECHANISM (HCC): ICD-10-CM

## 2024-12-19 NOTE — PROGRESS NOTES
Date of Encounter: 12/18/2024  Patient:  Soheila Arenas  YOB: 1940      Chief Complaint: Follow up on chronic medical conditions    History of Present Illness:  No issues reported by nursing staff. No reported complaints.      Past Medical/Surgical/Social History: Per admission note    Physical Exam  Vitals: Blood Pressure 132/64. Pulse 64. Temperature 98.9. Respirations 18  Constitutional: No acute distress  Eyes: Sclerae nonicteric. No lid lag or proptosis  HENT: External ears normal. No external lesions on the nose. No lesions on the lips  Neck: No gross masses. Trachea visibly midline  Respiratory: Good air entry bilaterally. No wheezing or crackles  Cardiovascular: Normal S1-S2. No murmurs  Gastrointestinal: No visible masses.   Skin: No abnormal rashes. No digital cyanosis  Neurologic: Cranial nerves grossly intact    Assessments   Diagnosis Orders   1. Insomnia, unspecified type        2. Hyperlipidemia, unspecified hyperlipidemia type        3. Cerebrovascular accident (CVA), unspecified mechanism (HCC)        4. Dysarthria        5. Dysphagia, unspecified type            Plan  I will be notified of any changes to the patient's condition. Unless otherwise noted, a gradual dose reduction in medications is not indicated at this time.      This note has been created using the Epic electronic health record, and dictated in part by [employer] dictation system. Despite the documenting physician's best efforts, there may be errors in spelling, grammar or syntax.

## 2025-01-22 ENCOUNTER — OUTSIDE SERVICES (OUTPATIENT)
Dept: INTERNAL MEDICINE | Age: 85
End: 2025-01-22

## 2025-01-22 DIAGNOSIS — G47.00 INSOMNIA, UNSPECIFIED TYPE: Primary | ICD-10-CM

## 2025-01-22 DIAGNOSIS — I63.9 CEREBROVASCULAR ACCIDENT (CVA), UNSPECIFIED MECHANISM (HCC): ICD-10-CM

## 2025-01-22 DIAGNOSIS — R47.1 DYSARTHRIA: ICD-10-CM

## 2025-01-22 DIAGNOSIS — E78.5 HYPERLIPIDEMIA, UNSPECIFIED HYPERLIPIDEMIA TYPE: ICD-10-CM

## 2025-01-22 DIAGNOSIS — R13.10 DYSPHAGIA, UNSPECIFIED TYPE: ICD-10-CM

## 2025-01-25 NOTE — PROGRESS NOTES
Date of Encounter: 1/22/2025  Patient:  Soheila Arenas  YOB: 1940      Chief Complaint: Follow up on chronic medical conditions    History of Present Illness:  No issues reported by nursing staff. No reported complaints.  She says that she thinks her UTI has improved    Past Medical/Surgical/Social History: Per admission note    Physical Exam  Vitals: Blood Pressure 126/68. Pulse 63. Temperature 97.4. Respirations 18  Constitutional: No acute distress  Eyes: Sclerae nonicteric. No lid lag or proptosis  HENT: External ears normal. No external lesions on the nose. No lesions on the lips  Neck: No gross masses. Trachea visibly midline  Respiratory: Good air entry bilaterally. No wheezing or crackles  Cardiovascular: Normal S1-S2. No murmurs  Gastrointestinal: No visible masses.   Skin: No abnormal rashes. No digital cyanosis  Neurologic: Cranial nerves grossly intact    Assessments   Diagnosis Orders   1. Insomnia, unspecified type        2. Hyperlipidemia, unspecified hyperlipidemia type        3. Cerebrovascular accident (CVA), unspecified mechanism (HCC)        4. Dysarthria        5. Dysphagia, unspecified type            Plan  I will be notified of any changes to the patient's condition. Unless otherwise noted, a gradual dose reduction in medications is not indicated at this time.      This note has been created using the Epic electronic health record, and dictated in part by [employer] dictation system. Despite the documenting physician's best efforts, there may be errors in spelling, grammar or syntax.

## 2025-02-26 ENCOUNTER — OUTSIDE SERVICES (OUTPATIENT)
Dept: INTERNAL MEDICINE | Age: 85
End: 2025-02-26
Payer: MEDICARE

## 2025-02-26 DIAGNOSIS — I63.9 CEREBROVASCULAR ACCIDENT (CVA), UNSPECIFIED MECHANISM (HCC): ICD-10-CM

## 2025-02-26 DIAGNOSIS — E78.5 HYPERLIPIDEMIA, UNSPECIFIED HYPERLIPIDEMIA TYPE: ICD-10-CM

## 2025-02-26 DIAGNOSIS — G47.00 INSOMNIA, UNSPECIFIED TYPE: Primary | ICD-10-CM

## 2025-02-26 DIAGNOSIS — R47.1 DYSARTHRIA: ICD-10-CM

## 2025-02-26 DIAGNOSIS — R13.10 DYSPHAGIA, UNSPECIFIED TYPE: ICD-10-CM

## 2025-02-26 PROCEDURE — 99309 SBSQ NF CARE MODERATE MDM 30: CPT | Performed by: INTERNAL MEDICINE

## 2025-02-26 NOTE — PROGRESS NOTES
Date of Encounter: 2/26/2025  Patient:  Soheila Arenas  YOB: 1940      Chief Complaint: Follow up on chronic medical conditions    History of Present Illness:  No issues reported by nursing staff. No reported complaints.  She recently finished antibiotics for UTI.  Says that she feels fine at this time.  Denies dysuria, hematuria, urinary frequency.    Past Medical/Surgical/Social History: Per admission note    Physical Exam  Most Recent Vitals: Blood Pressure 110/64. Pulse 76. Temperature 97.3. Respirations 16  Constitutional: No acute distress  Eyes: Sclerae nonicteric. No lid lag or proptosis  HENT: External ears normal. No external lesions on the nose. No lesions on the lips  Neck: No gross masses. Trachea visibly midline  Respiratory: Good air entry bilaterally. No wheezing or crackles  Cardiovascular: Normal S1-S2. No murmurs  Gastrointestinal: No visible masses.   Skin: No abnormal rashes. No digital cyanosis  Neurologic: Cranial nerves grossly intact    Assessments   Diagnosis Orders   1. Insomnia, unspecified type        2. Hyperlipidemia, unspecified hyperlipidemia type        3. Cerebrovascular accident (CVA), unspecified mechanism (HCC)        4. Dysarthria        5. Dysphagia, unspecified type            Plan  I will be notified of any changes to the patient's condition. Unless otherwise noted, a gradual dose reduction in medications is not indicated at this time.      This note has been created using the Epic electronic health record, and dictated in part by [employer] dictation system. Despite the documenting physician's best efforts, there may be errors in spelling, grammar or syntax.

## 2025-03-26 ENCOUNTER — OUTSIDE SERVICES (OUTPATIENT)
Dept: INTERNAL MEDICINE | Age: 85
End: 2025-03-26

## 2025-03-26 DIAGNOSIS — E78.5 HYPERLIPIDEMIA, UNSPECIFIED HYPERLIPIDEMIA TYPE: ICD-10-CM

## 2025-03-26 DIAGNOSIS — R13.10 DYSPHAGIA, UNSPECIFIED TYPE: ICD-10-CM

## 2025-03-26 DIAGNOSIS — I63.9 CEREBROVASCULAR ACCIDENT (CVA), UNSPECIFIED MECHANISM (HCC): ICD-10-CM

## 2025-03-26 DIAGNOSIS — R47.1 DYSARTHRIA: ICD-10-CM

## 2025-03-26 DIAGNOSIS — G47.00 INSOMNIA, UNSPECIFIED TYPE: Primary | ICD-10-CM

## 2025-03-29 NOTE — PROGRESS NOTES
Date of Encounter: 3/26/2025  Patient:  Soheila Arenas  YOB: 1940      Chief Complaint: Follow up on chronic medical conditions    History of Present Illness:  No issues reported by nursing staff. No reported complaints.      Past Medical/Surgical/Social History: Per admission note    Physical Exam  Most Recent Vitals: Blood Pressure 118/52. Pulse 62. Temperature 98.3. Respirations 16  Constitutional: No acute distress  Eyes: Sclerae nonicteric. No lid lag or proptosis  HENT: External ears normal. No external lesions on the nose. No lesions on the lips  Neck: No gross masses. Trachea visibly midline  Respiratory: Good air entry bilaterally. No wheezing or crackles  Cardiovascular: Normal S1-S2. No murmurs  Gastrointestinal: No visible masses.   Skin: No abnormal rashes. No digital cyanosis  Neurologic: Cranial nerves grossly intact    Assessments   Diagnosis Orders   1. Insomnia, unspecified type        2. Hyperlipidemia, unspecified hyperlipidemia type        3. Cerebrovascular accident (CVA), unspecified mechanism (HCC)        4. Dysarthria        5. Dysphagia, unspecified type            Plan  I will be notified of any changes to the patient's condition. Unless otherwise noted, a gradual dose reduction in medications is not indicated at this time.      This note has been created using the Epic electronic health record, and dictated in part by [employer] dictation system. Despite the documenting physician's best efforts, there may be errors in spelling, grammar or syntax.

## 2025-07-30 ENCOUNTER — OUTSIDE SERVICES (OUTPATIENT)
Dept: INTERNAL MEDICINE | Age: 85
End: 2025-07-30
Payer: MEDICARE

## 2025-07-30 DIAGNOSIS — G47.00 INSOMNIA, UNSPECIFIED TYPE: Primary | ICD-10-CM

## 2025-07-30 DIAGNOSIS — I63.9 CEREBROVASCULAR ACCIDENT (CVA), UNSPECIFIED MECHANISM (HCC): ICD-10-CM

## 2025-07-30 DIAGNOSIS — E78.5 HYPERLIPIDEMIA, UNSPECIFIED HYPERLIPIDEMIA TYPE: ICD-10-CM

## 2025-07-30 DIAGNOSIS — R47.1 DYSARTHRIA: ICD-10-CM

## 2025-07-30 DIAGNOSIS — R13.10 DYSPHAGIA, UNSPECIFIED TYPE: ICD-10-CM

## 2025-07-30 PROCEDURE — 99349 HOME/RES VST EST MOD MDM 40: CPT | Performed by: INTERNAL MEDICINE

## 2025-08-02 NOTE — PROGRESS NOTES
Date of Encounter: 7/30/2025  Patient:  Soheila Arenas  YOB: 1940      Chief Complaint: Dysphagia    History of Present Illness:  I was informed by the nursing staff that she has occasional dysphagia.  However, patient herself says that she feels fine.  Denies dysphagia, abdominal pain, nausea, vomiting.  Says that her appetite is okay.  No sore throat or URI symptoms.    Past Medical/Surgical/Social History: Per admission note    Physical Exam  Most Recent Vitals: Blood Pressure 103/63. Pulse 58. Temperature 97.1. Respirations 18  Constitutional: No acute distress  Eyes: Sclerae nonicteric. No lid lag or proptosis  HENT: External ears normal. No external lesions on the nose. No lesions on the lips  Neck: No gross masses. Trachea visibly midline  Respiratory: Good air entry bilaterally. No wheezing or crackles  Cardiovascular: Normal S1-S2. No murmurs  Gastrointestinal: No visible masses.   Skin: No abnormal rashes. No digital cyanosis  Neurologic: Cranial nerves grossly intact    Assessments   Diagnosis Orders   1. Insomnia, unspecified type        2. Hyperlipidemia, unspecified hyperlipidemia type        3. Cerebrovascular accident (CVA), unspecified mechanism (HCC)        4. Dysarthria        5. Dysphagia, unspecified type            Plan  I will be notified of any changes to the patient's condition. Unless otherwise noted, a gradual dose reduction in medications is not indicated at this time.      This note has been created using the Epic electronic health record, and dictated in part by [employer] dictation system. Despite the documenting physician's best efforts, there may be errors in spelling, grammar or syntax.